# Patient Record
Sex: MALE | Race: WHITE
[De-identification: names, ages, dates, MRNs, and addresses within clinical notes are randomized per-mention and may not be internally consistent; named-entity substitution may affect disease eponyms.]

---

## 2018-07-08 NOTE — UC
Laceration HPI





- HPI Summary


HPI Summary: 


Cut right index finger on a .





- History Of Current Complaint


Chief Complaint: UCLaceration


Stated Complaint: LACERATION RIGHT INDEX FINGER


Time Seen by Provider: 07/08/18 21:02


Hx Obtained From: Patient


Laceration Location: Finger - right index


Mechanism Of Injury: Sharp Trauma


Onset/Duration: Sudden Onset


Severity: Moderate


Pain Intensity: 0


Aggravating Factors: Movement


Related History: Dominant Hand Right





- Allergies/Home Medications


Allergies/Adverse Reactions: 


 Allergies











Allergy/AdvReac Type Severity Reaction Status Date / Time


 


No Known Allergies Allergy   Verified 07/08/18 20:55














PMH/Surg Hx/FS Hx/Imm Hx


Endocrine History: Diabetes


Cardiovascular History: Hypertension





- Surgical History


Surgical History: Yes


Surgery Procedure, Year, and Place: Tonsillectomy, ~1970





- Family History


Known Family History: Positive: Cardiac Disease, Hypertension, Diabetes





- Social History


Occupation: Employed Full-time


Lives: With Family


Alcohol Use: Rare


Substance Use Type: None


Smoking Status (MU): Never Smoked Tobacco





- Immunization History


Most Recent Tetanus Shot: Unsure, maybe within the last five years





Review of Systems


Is Patient Immunocompromised?: No


All Other Systems Reviewed And Are Negative: Yes





Physical Exam


Triage Information Reviewed: Yes


Appearance: Well-Appearing, No Pain Distress, Obese


Vital Signs: 


 Initial Vital Signs











Temp  97.6 F   07/08/18 20:54


 


Pulse  86   07/08/18 20:54


 


Resp  20   07/08/18 20:54


 


BP  157/93   07/08/18 20:54


 


Pulse Ox  100   07/08/18 20:54











Vital Signs Reviewed: Yes


Eyes: Positive: Conjunctiva Inflamed


Neck exam: Normal


Respiratory: Positive: Lungs clear


Cardiovascular Exam: Normal


Musculoskeletal Exam: Normal


Neurological Exam: Normal


Psychological Exam: Normal


Skin Exam: Normal





Laceration Repair





- Laceration Repair


  ** 1


Description: Linear


Laceration Size After Repair: Length (cm) - 3.7


Modified For Repair: No


Type Injection: Local


Anesthesia Used: 2.0% Lido


Additive Used (in ml): Epi


Cleansing Completed Via Routine Prep: Yes


Irrigation With Pressure Irrigation Device: Yes


Closure Material: Sutures


Closure Method: Single Layer


Suture Of: Skin - running suture #10 stitches


Suture Type: Nylon - 4-0





Laceration Course/Dx





- Differential Dx - Laceration/Wound


Differental Diagnoses: Abrasion, Avulsion, Dehiscence, Laceration


Provider Diagnoses: 3.7 cm laceration right index finger





Discharge





- Sign-Out/Discharge


Documenting (check all that apply): Discharge/Admit/Transfer





- Discharge Plan


Condition: Stable


Disposition: HOME


Patient Education Materials:  Care For Your Stitches (ED), Laceration (ED), 

Diphtheria/Acellular Pertussis/Tetanus Vaccine (By injection)


Referrals: 


Lo Andrade MD [Primary Care Provider] -  (Suture removal in 10 days at your 

PCP's office or here at [facility].)


Additional Instructions: 


Ok to shower tonight.


Use antibiotic ointment and a band aid to cover the finger until the stitches 

come out in 10 days.





- Billing Disposition and Condition


Condition: STABLE


Disposition: Home





Images


Hands: 


  __________________________














  __________________________





 1 - 3.7 cm laceration

## 2018-07-24 NOTE — UC
HPI Wound/Suture Re-check





- HPI Summary


HPI Summary: 





51 y/o female presents to the urgent care for suture removal s/p laceration of  

Rt index on 7/8/2018. Pt states he had 10 sutures were placed here at the 

clinic. Pt denies signs of infection, he has FROM of finger and denies fever, 

SOB, chest pain, abdominal pain, N/V/D. 





- History Of Current Complaint


Chief Complaint: UCGeneralIllness


Stated Complaint: SUTURE REMOVAL


Time Seen by Provider: 07/24/18 14:25


Hx Obtained From: Patient


Onset/Duration: Sudden Onset, Lasting Weeks - 7/8/2018, Still Present


Pain Intensity: 0


Pain Scale Used: 0-10 Numeric





- Allergies/Home Medications


Allergies/Adverse Reactions: 


 Allergies











Allergy/AdvReac Type Severity Reaction Status Date / Time


 


No Known Allergies Allergy   Verified 07/24/18 14:17











Home Medications: 


 Home Medications





Ascorbic Acid TAB* [Vitamin C  TAB*] 500 mg PO DAILY 07/24/18 [History 

Confirmed 07/24/18]


Aspirin 81 mg CHEW TAB* [Aspirin Low Dose TAB*] 81 mg PO DAILY 07/24/18 [

History Confirmed 07/24/18]


Atorvastatin* [Lipitor*] 40 mg PO DAILY 07/24/18 [History Confirmed 07/24/18]


Carvedilol TAB* [Coreg TAB*] 3.125 mg PO BID 07/24/18 [History Confirmed 07/24/ 18]


Cephalexin CAP* [Keflex CAP*] 500 mg PO BID 07/24/18 [History Confirmed 07/24/18

]


Cholecalciferol TAB* [Vitamin D TAB*] 50,000 units PO WEEKLY 07/24/18 [History 

Confirmed 07/24/18]


Furosemide TAB* [Lasix TAB*] 120 mg PO DAILY 07/24/18 [History Confirmed 07/24/ 18]


Insulin GLARGINE(*) [Lantus(*)] 35 units SUBCUT Q24H 07/24/18 [History 

Confirmed 07/24/18]


Insulin Lispro [Humalog] 100 unit SC 07/24/18 [History]


Multivitamin [Multivitamins] 1 cap PO 07/24/18 [History]


Sertraline* [Zoloft*] 50 mg PO DAILY 07/24/18 [History Confirmed 07/24/18]


Sevelamer Carbonate [Renvela] 2.4 gm PO TID 07/24/18 [History Confirmed 07/24/18

]


SitaGLIPtin (NF) [Januvia (NF)] 25 mg PO DAILY 07/24/18 [History Confirmed 07/24 /18]


Sodium Bicarbonate 650 mg PO QID 07/24/18 [History Confirmed 07/24/18]


hydrALAZINE TAB* [Apresoline TAB*] 50 mg PO BID 07/24/18 [History Confirmed 07/ 24/18]











PMH/Surg Hx/FS Hx/Imm Hx


Previously Healthy: Yes


Endocrine History: Diabetes


Cardiovascular History: Hypertension


Other GI/ History: Kidney failure





- Surgical History


Surgical History: Yes


Surgery Procedure, Year, and Place: Tonsillectomy, ~1970





- Family History


Known Family History: Positive: Cardiac Disease, Hypertension, Diabetes





- Social History


Occupation: Employed Full-time


Lives: With Family


Alcohol Use: Rare


Substance Use Type: None


Smoking Status (MU): Never Smoked Tobacco





- Immunization History


Most Recent Tetanus Shot: Unsure, maybe within the last five years





Review of Systems


Constitutional: Negative


Skin: Other - laceration repair w/ sutures of Rt index


Eyes: Negative


ENT: Negative


Respiratory: Negative


Cardiovascular: Negative


Gastrointestinal: Negative


Genitourinary: Negative


Motor: Negative


Neurovascular: Negative


Musculoskeletal: Negative


Neurological: Negative


Psychological: Negative


Is Patient Immunocompromised?: No


All Other Systems Reviewed And Are Negative: Yes





Physical Exam


Triage Information Reviewed: Yes


Appearance: Well-Appearing, Well-Nourished, Obese - male


Vital Signs: 


 Initial Vital Signs











Temp  99.8 F   07/24/18 14:23


 


Pulse  83   07/24/18 14:23


 


Resp  18   07/24/18 14:23


 


BP  132/76   07/24/18 14:23


 


Pulse Ox  96   07/24/18 14:23











Vital Signs Reviewed: Yes


Eye Exam: Normal


ENT Exam: Normal


Dental Exam: Normal


Neck exam: Normal


Respiratory Exam: Normal


Cardiovascular Exam: Normal


Abdominal Exam: Normal


Bowel Sounds: Positive: Present


Musculoskeletal Exam: Normal


Neurological Exam: Normal


Psychological Exam: Normal


Skin: Positive: Other - Rt 2nd phalanx on the ventral size near the DIPJ w/ 

semiluar laceration healing well w/ 10 continuous sutures, FROM of phlanx, 

sensation intact, pulses WNL, capillary refill brisk.





Course/Dx





- Course


Course Of Treatment: 51 y/o female presents to the urgent care for suture 

removal s/p laceration of RT index on 7/8/2018. Pt states he had 10 sutures 

were placed here at the clinic. Pt denies signs of infection, he has FROM of 

finger and denies fever, SOB, chest pain, abdominal pain, N/V/D. Hx obtained. 

Pt w/ 10 sutures in placed in the RT inex finger. Wound healing well with 

crusting and moderate granulation over, non tender to palpation, 10 continues 

sutures in place. 10 sutures removed w/o any difficulty. Pt tolerated well 

procedure. wound cleaned with sterile water and bacitracin applied over and 

cover with sterile gauze. Pt advised if redness, pain or fever develops to 

return to the urgent care or f/u with PCP for further treatment.  Pt understood 

and agreed with plan of care





- Differential Dx - Laceration/Wound


Differential Diagnoses: Cellulitis, Healing Wound, Suture Removal


Provider Diagnoses: 1- RT index suture removal s/p laceration





Discharge





- Sign-Out/Discharge


Documenting (check all that apply): Patient Departure - D/c home





- Discharge Plan


Condition: Stable


Disposition: HOME


Patient Education Materials:  Acute Wound Care (ED)


Referrals: 


Lo Andrade MD [Primary Care Provider] - If Needed


Additional Instructions: 


1-Please continue applying  Triple antibiotic oint BID x 7 days. Keep wound 

clean and dry.  


2-If symptoms you develop signs of infections please f/u with your PCP or 

return to the urgent care for further evaluation and treatment.











- Billing Disposition and Condition


Condition: STABLE


Disposition: Home

## 2019-11-05 ENCOUNTER — HOSPITAL ENCOUNTER (EMERGENCY)
Dept: HOSPITAL 25 - UCCORT | Age: 54
Discharge: HOME | End: 2019-11-05
Payer: COMMERCIAL

## 2019-11-05 VITALS — SYSTOLIC BLOOD PRESSURE: 140 MMHG | DIASTOLIC BLOOD PRESSURE: 79 MMHG
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
## 2019-11-05 DIAGNOSIS — H92.09: ICD-10-CM

## 2019-11-05 DIAGNOSIS — R09.82: ICD-10-CM
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
## 2019-11-05 DIAGNOSIS — R09.89: ICD-10-CM

## 2019-11-05 DIAGNOSIS — R05: ICD-10-CM

## 2019-11-05 DIAGNOSIS — T78.40XA: Primary | ICD-10-CM

## 2019-11-05 DIAGNOSIS — I10: ICD-10-CM

## 2019-11-05 PROCEDURE — G0463 HOSPITAL OUTPT CLINIC VISIT: HCPCS

## 2019-11-05 PROCEDURE — 99212 OFFICE O/P EST SF 10 MIN: CPT

## 2019-11-05 NOTE — UC
FLU HPI





- HPI Summary


HPI Summary: 





53 yo male presents with cold symptoms. He tells me that for the past 2 weeks 

he has had a runny nose, post nasal drip, and feeling that his ears are 

plugged. He has been taking claritin with mild relief. Denies fever, chills, 

cough, SOB, rash, abdominal pain, n/v.





- History of Current Complaint


Chief Complaint: UCGeneralIllness


Stated Complaint: RUNNY NOSE,FULL EARS,COUGH


Time Seen by Provider: 11/05/19 14:35


Hx Obtained From: Patient


Onset/Duration: Gradual Onset


Severity Currently: None


Pain Intensity: 0





- Allergy/Home Medications


Allergies/Adverse Reactions: 


 Allergies











Allergy/AdvReac Type Severity Reaction Status Date / Time


 


No Known Allergies Allergy   Verified 11/05/19 14:35














PMH/Surg Hx/FS Hx/Imm Hx


Endocrine History: Diabetes, Dyslipidemia


Cardiovascular History: Hypertension


Psychological History: Anxiety, Depression





- Surgical History


Surgical History: Yes


Surgery Procedure, Year, and Place: Tonsillectomy, ~1970





- Family History


Known Family History: Positive: Cardiac Disease, Hypertension, Diabetes





- Social History


Lives: With Family


Alcohol Use: Occasionally


Substance Use Type: None


Smoking Status (MU): Never Smoked Tobacco





- Immunization History


Most Recent Tetanus Shot: Unsure, maybe within the last five years





Review of Systems


All Other Systems Reviewed And Are Negative: No


Constitutional: Positive: Negative


Skin: Positive: Negative


Eyes: Positive: Negative


ENT: Positive: Ear Ache, Nasal Discharge


Respiratory: Positive: Cough


Cardiovascular: Positive: Negative


Gastrointestinal: Positive: Negative


Neurological: Positive: Negative


Psychological: Positive: Negative





Physical Exam





- Summary


Physical Exam Summary: 





GENERAL: NAD. WDWN. No pain distress.


SKIN: No rashes, sores, lesions, or open wounds.


HEENT:


            Head: AT/NC


            Eyes: EOM intact. Conjunctiva clear without inflammation or 

discharge.


            Ears: Hearing grossly normal. TMs intact, no bulging, erythema, or 

edema. 


            Nose: Nasal mucosa pink and moist. NTTP maxillary and frontal 

sinus. 


            Throat: Posterior oropharynx without exudates, erythema, or 

tonsillar enlargement.  Uvula midline.


NECK: Supple. Nontender. No lymphadenopathy. 


CHEST:  CTAB. No accessory muscle use. Breathing comfortably and in no distress.


CV:  RRR. Pulses intact. Cap refill <2seconds


NEURO: Alert. 


PSYCH: Age appropriate behavior.


Triage Information Reviewed: Yes


Vital Signs: 


 Initial Vital Signs











Temp  97.9 F   11/05/19 14:31


 


Pulse  84   11/05/19 14:31


 


Resp  18   11/05/19 14:31


 


BP  140/79   11/05/19 14:31


 


Pulse Ox  100   11/05/19 14:31











Vital Signs Reviewed: Yes





Flu Course/Dx





- Course


Course Of Treatment: 





Exam WNL. Suspect allergies vs viral syndrome. Will have him continue claritin 

OTC and will rx for flonase and mucinex. Advised to f/u with PCP if symptoms do 

not improve





- Differential Dx/Diagnosis


Provider Diagnosis: 


 Allergies








Discharge ED





- Sign-Out/Discharge


Documenting (check all that apply): Patient Departure


All imaging exams completed and their final reports reviewed: No Studies





- Discharge Plan


Condition: Stable


Disposition: HOME


Prescriptions: 


Fluticasone NASAL SPRAY 50MCG* [Flonase NASAL SPRAY 50MCG*] 2 spray BOTH NARES 

DAILY #1 btl


guaiFENesin ER TAB [Mucinex*] 600 mg PO BID #14 tab.er


Patient Education Materials:  Allergies (ED)


Referrals: 


Lo Andrade MD [Primary Care Provider] - 


Additional Instructions: 


 Your symptoms are likely from a viral infection. Viral infections do not 

respond to antibiotics and are limited to the treatment of symptoms. Viral 

infections typically run their course in 7-10 days.


 


Drink plenty of fluids, especially if you are running any fever.


 


Use salt water gargles several times a day.


 


Take over the counter acetaminophen (Tylenol) or ibuprofen (Advil, Motrin) 

according to directions as needed for pain or fever.


 


You may also use Chloraseptic spray or Cepacol lonzenges according to 

directions which contain a numbing medication and can provide some temporary 

relief from a sore throat.


 


Return here or follow up with your primary care provider in 7 days if symptoms 

persist.








- Billing Disposition and Condition


Condition: STABLE


Disposition: Home

## 2019-11-17 ENCOUNTER — HOSPITAL ENCOUNTER (EMERGENCY)
Dept: HOSPITAL 25 - UCCORT | Age: 54
Discharge: HOME | End: 2019-11-17
Payer: COMMERCIAL

## 2019-11-17 VITALS — SYSTOLIC BLOOD PRESSURE: 119 MMHG | DIASTOLIC BLOOD PRESSURE: 74 MMHG

## 2019-11-17 DIAGNOSIS — Z82.49: ICD-10-CM

## 2019-11-17 DIAGNOSIS — R53.83: ICD-10-CM

## 2019-11-17 DIAGNOSIS — J40: Primary | ICD-10-CM

## 2019-11-17 DIAGNOSIS — R09.81: ICD-10-CM

## 2019-11-17 DIAGNOSIS — H92.03: ICD-10-CM

## 2019-11-17 DIAGNOSIS — I10: ICD-10-CM

## 2019-11-17 PROCEDURE — 99212 OFFICE O/P EST SF 10 MIN: CPT

## 2019-11-17 PROCEDURE — G0463 HOSPITAL OUTPT CLINIC VISIT: HCPCS

## 2019-11-17 NOTE — UC
General HPI





- HPI Summary


HPI Summary: 





Pt presents with c/o of worsening cough, nasal congestion ,bilateral ear 

congestion muffled sounds decreased hearing, sob with exertion and fatigue X 2 

weeks. Pt was seen here 2 weeks ago and given flonase and zyrtec for congetion 

and ear discomfort with no improvement





- History of Current Complaint


Chief Complaint: UCRespiratory


Stated Complaint: CONGESTION, EARS FEEL FULL


Time Seen by Provider: 11/17/19 17:15


Hx Obtained From: Patient


Onset/Duration: Gradual Onset, Lasting Weeks, Still Present, Worse Since - onset


Timing: Constant


Onset Severity: Mild


Current Severity: Moderate


Pain Intensity: 0


Associated Signs & Symptoms: Positive: Cough, SOB, Wheezing





- Allergy/Home Medications


Allergies/Adverse Reactions: 


 Allergies











Allergy/AdvReac Type Severity Reaction Status Date / Time


 


No Known Allergies Allergy   Verified 11/05/19 14:35











Home Medications: 


 Home Medications





Cetirizine* [ZyrTEC 10 MG TAB*] 10 mg PO DAILY 11/17/19 [History Confirmed 11/17 /19]











PMH/Surg Hx/FS Hx/Imm Hx


Previously Healthy: Yes


Cardiovascular History: Hypertension





- Surgical History


Surgical History: Yes


Surgery Procedure, Year, and Place: Tonsillectomy, ~1970





- Family History


Known Family History: Positive: Cardiac Disease, Hypertension, Diabetes





- Social History


Occupation: Employed Full-time


Lives: With Family


Alcohol Use: Occasionally


Substance Use Type: None


Smoking Status (MU): Never Smoked Tobacco


Have You Smoked in the Last Year: No





- Immunization History


Most Recent Tetanus Shot: Unsure, maybe within the last five years





Review of Systems


All Other Systems Reviewed And Are Negative: Yes


Constitutional: Positive: Chills, Fatigue


Skin: Positive: Negative


Eyes: Positive: Negative


ENT: Positive: Ear Ache, Nasal Discharge, Sinus Congestion


Respiratory: Positive: Shortness Of Breath, Cough


Cardiovascular: Positive: Negative


Gastrointestinal: Positive: Negative


Genitourinary: Positive: Negative


Motor: Positive: Negative


Neurovascular: Positive: Negative


Musculoskeletal: Positive: Negative


Neurological: Positive: Negative


Psychological: Positive: Negative


Is Patient Immunocompromised?: No





Physical Exam


Triage Information Reviewed: Yes


Appearance: Ill-Appearing


Vital Signs: 


 Initial Vital Signs











Temp  98.2 F   11/17/19 16:58


 


Pulse  80   11/17/19 16:58


 


Resp  20   11/17/19 16:58


 


BP  119/74   11/17/19 16:58


 


Pulse Ox  99   11/17/19 16:58











Vital Signs Reviewed: Yes


Eye Exam: Normal


ENT: Positive: Nasal congestion


Dental Exam: Normal


Neck exam: Normal


Respiratory Exam: Normal


Respiratory: Positive: Normal breath sounds


Cardiovascular Exam: Normal


Cardiovascular: Positive: RRR, No Murmur


Musculoskeletal Exam: Normal


Musculoskeletal: Positive: No Edema


Neurological Exam: Normal


Psychological Exam: Normal


Skin Exam: Normal





Course/Dx





- Course


Course Of Treatment: 





I discusses viral vs bacterial and pt requested antibiotics. 





- Differential Dx - Multi-Symptom


Differential Diagnoses: Other - chf, viral syndrome





- Diagnoses


Provider Diagnosis: 


 Ear ache, Bronchitis, Fatigue








Discharge ED





- Sign-Out/Discharge


Documenting (check all that apply): Patient Departure


All imaging exams completed and their final reports reviewed: No Studies





- Discharge Plan


Condition: Stable


Disposition: HOME


Prescriptions: 


Azithromycin TAB* [Zithromax TAB (Z-LISE) 250 mg #6 tabs] 2 tab PO .TODAY, THEN 

1 DAILY #1 lise


predniSONE TAB* [Deltasone 20 MG TAB*] 20 mg PO DAILY #4 tab


Patient Education Materials:  Acute Bronchitis (ED)


Referrals: 


Lo Andrade MD [Primary Care Provider] - As Soon As Possible


Additional Instructions: 


Please follow up with your PCP as soon as possible. 





- Billing Disposition and Condition


Condition: STABLE


Disposition: Home